# Patient Record
Sex: FEMALE | Race: WHITE | NOT HISPANIC OR LATINO | URBAN - METROPOLITAN AREA
[De-identification: names, ages, dates, MRNs, and addresses within clinical notes are randomized per-mention and may not be internally consistent; named-entity substitution may affect disease eponyms.]

---

## 2022-05-29 ENCOUNTER — INPATIENT (INPATIENT)
Facility: HOSPITAL | Age: 32
LOS: 0 days | Discharge: HOME | End: 2022-05-30
Attending: OBSTETRICS & GYNECOLOGY | Admitting: OBSTETRICS & GYNECOLOGY
Payer: COMMERCIAL

## 2022-05-29 VITALS — HEART RATE: 74 BPM | OXYGEN SATURATION: 96 % | SYSTOLIC BLOOD PRESSURE: 100 MMHG | DIASTOLIC BLOOD PRESSURE: 55 MMHG

## 2022-05-29 DIAGNOSIS — O26.899 OTHER SPECIFIED PREGNANCY RELATED CONDITIONS, UNSPECIFIED TRIMESTER: ICD-10-CM

## 2022-05-29 DIAGNOSIS — Z3A.00 WEEKS OF GESTATION OF PREGNANCY NOT SPECIFIED: ICD-10-CM

## 2022-05-29 DIAGNOSIS — Z34.80 ENCOUNTER FOR SUPERVISION OF OTHER NORMAL PREGNANCY, UNSPECIFIED TRIMESTER: ICD-10-CM

## 2022-05-29 LAB
BASOPHILS # BLD AUTO: 0.03 K/UL — SIGNIFICANT CHANGE UP (ref 0–0.2)
BASOPHILS NFR BLD AUTO: 0.4 % — SIGNIFICANT CHANGE UP (ref 0–2)
BLD GP AB SCN SERPL QL: NEGATIVE — SIGNIFICANT CHANGE UP
COVID-19 SPIKE DOMAIN AB INTERP: POSITIVE
COVID-19 SPIKE DOMAIN ANTIBODY RESULT: 226 U/ML — HIGH
EOSINOPHIL # BLD AUTO: 0 K/UL — SIGNIFICANT CHANGE UP (ref 0–0.5)
EOSINOPHIL NFR BLD AUTO: 0 % — SIGNIFICANT CHANGE UP (ref 0–6)
HCT VFR BLD CALC: 39.2 % — SIGNIFICANT CHANGE UP (ref 34.5–45)
HGB BLD-MCNC: 13.9 G/DL — SIGNIFICANT CHANGE UP (ref 11.5–15.5)
IMM GRANULOCYTES NFR BLD AUTO: 0.2 % — SIGNIFICANT CHANGE UP (ref 0–1.5)
LYMPHOCYTES # BLD AUTO: 1.13 K/UL — SIGNIFICANT CHANGE UP (ref 1–3.3)
LYMPHOCYTES # BLD AUTO: 14 % — SIGNIFICANT CHANGE UP (ref 13–44)
MCHC RBC-ENTMCNC: 32.9 PG — SIGNIFICANT CHANGE UP (ref 27–34)
MCHC RBC-ENTMCNC: 35.5 GM/DL — SIGNIFICANT CHANGE UP (ref 32–36)
MCV RBC AUTO: 92.7 FL — SIGNIFICANT CHANGE UP (ref 80–100)
MONOCYTES # BLD AUTO: 0.54 K/UL — SIGNIFICANT CHANGE UP (ref 0–0.9)
MONOCYTES NFR BLD AUTO: 6.7 % — SIGNIFICANT CHANGE UP (ref 2–14)
NEUTROPHILS # BLD AUTO: 6.33 K/UL — SIGNIFICANT CHANGE UP (ref 1.8–7.4)
NEUTROPHILS NFR BLD AUTO: 78.7 % — HIGH (ref 43–77)
NRBC # BLD: 0 /100 WBCS — SIGNIFICANT CHANGE UP (ref 0–0)
PLATELET # BLD AUTO: 154 K/UL — SIGNIFICANT CHANGE UP (ref 150–400)
RBC # BLD: 4.23 M/UL — SIGNIFICANT CHANGE UP (ref 3.8–5.2)
RBC # FLD: 12.3 % — SIGNIFICANT CHANGE UP (ref 10.3–14.5)
RH IG SCN BLD-IMP: POSITIVE — SIGNIFICANT CHANGE UP
RH IG SCN BLD-IMP: POSITIVE — SIGNIFICANT CHANGE UP
SARS-COV-2 IGG+IGM SERPL QL IA: 226 U/ML — HIGH
SARS-COV-2 IGG+IGM SERPL QL IA: POSITIVE
SARS-COV-2 RNA SPEC QL NAA+PROBE: SIGNIFICANT CHANGE UP
WBC # BLD: 8.05 K/UL — SIGNIFICANT CHANGE UP (ref 3.8–10.5)
WBC # FLD AUTO: 8.05 K/UL — SIGNIFICANT CHANGE UP (ref 3.8–10.5)

## 2022-05-29 RX ORDER — PRAMOXINE HYDROCHLORIDE 150 MG/15G
1 AEROSOL, FOAM RECTAL EVERY 4 HOURS
Refills: 0 | Status: DISCONTINUED | OUTPATIENT
Start: 2022-05-29 | End: 2022-05-30

## 2022-05-29 RX ORDER — SODIUM CHLORIDE 9 MG/ML
3 INJECTION INTRAMUSCULAR; INTRAVENOUS; SUBCUTANEOUS EVERY 8 HOURS
Refills: 0 | Status: DISCONTINUED | OUTPATIENT
Start: 2022-05-29 | End: 2022-05-30

## 2022-05-29 RX ORDER — ACETAMINOPHEN 500 MG
975 TABLET ORAL
Refills: 0 | Status: DISCONTINUED | OUTPATIENT
Start: 2022-05-29 | End: 2022-05-30

## 2022-05-29 RX ORDER — OXYTOCIN 10 UNIT/ML
4 VIAL (ML) INJECTION
Qty: 30 | Refills: 0 | Status: DISCONTINUED | OUTPATIENT
Start: 2022-05-29 | End: 2022-05-30

## 2022-05-29 RX ORDER — OXYTOCIN 10 UNIT/ML
333.33 VIAL (ML) INJECTION
Qty: 20 | Refills: 0 | Status: DISCONTINUED | OUTPATIENT
Start: 2022-05-29 | End: 2022-05-30

## 2022-05-29 RX ORDER — CITRIC ACID/SODIUM CITRATE 300-500 MG
15 SOLUTION, ORAL ORAL EVERY 6 HOURS
Refills: 0 | Status: DISCONTINUED | OUTPATIENT
Start: 2022-05-29 | End: 2022-05-29

## 2022-05-29 RX ORDER — DIBUCAINE 1 %
1 OINTMENT (GRAM) RECTAL EVERY 6 HOURS
Refills: 0 | Status: DISCONTINUED | OUTPATIENT
Start: 2022-05-29 | End: 2022-05-30

## 2022-05-29 RX ORDER — AER TRAVELER 0.5 G/1
1 SOLUTION RECTAL; TOPICAL EVERY 4 HOURS
Refills: 0 | Status: DISCONTINUED | OUTPATIENT
Start: 2022-05-29 | End: 2022-05-30

## 2022-05-29 RX ORDER — MAGNESIUM HYDROXIDE 400 MG/1
30 TABLET, CHEWABLE ORAL
Refills: 0 | Status: DISCONTINUED | OUTPATIENT
Start: 2022-05-29 | End: 2022-05-30

## 2022-05-29 RX ORDER — TETANUS TOXOID, REDUCED DIPHTHERIA TOXOID AND ACELLULAR PERTUSSIS VACCINE, ADSORBED 5; 2.5; 8; 8; 2.5 [IU]/.5ML; [IU]/.5ML; UG/.5ML; UG/.5ML; UG/.5ML
0.5 SUSPENSION INTRAMUSCULAR ONCE
Refills: 0 | Status: DISCONTINUED | OUTPATIENT
Start: 2022-05-29 | End: 2022-05-30

## 2022-05-29 RX ORDER — OXYCODONE HYDROCHLORIDE 5 MG/1
5 TABLET ORAL ONCE
Refills: 0 | Status: DISCONTINUED | OUTPATIENT
Start: 2022-05-29 | End: 2022-05-30

## 2022-05-29 RX ORDER — LANOLIN
1 OINTMENT (GRAM) TOPICAL EVERY 6 HOURS
Refills: 0 | Status: DISCONTINUED | OUTPATIENT
Start: 2022-05-29 | End: 2022-05-30

## 2022-05-29 RX ORDER — SODIUM CHLORIDE 9 MG/ML
1000 INJECTION, SOLUTION INTRAVENOUS
Refills: 0 | Status: DISCONTINUED | OUTPATIENT
Start: 2022-05-29 | End: 2022-05-29

## 2022-05-29 RX ORDER — KETOROLAC TROMETHAMINE 30 MG/ML
30 SYRINGE (ML) INJECTION ONCE
Refills: 0 | Status: DISCONTINUED | OUTPATIENT
Start: 2022-05-29 | End: 2022-05-29

## 2022-05-29 RX ORDER — BENZOCAINE 10 %
1 GEL (GRAM) MUCOUS MEMBRANE EVERY 6 HOURS
Refills: 0 | Status: DISCONTINUED | OUTPATIENT
Start: 2022-05-29 | End: 2022-05-30

## 2022-05-29 RX ORDER — DIPHENHYDRAMINE HCL 50 MG
25 CAPSULE ORAL EVERY 6 HOURS
Refills: 0 | Status: DISCONTINUED | OUTPATIENT
Start: 2022-05-29 | End: 2022-05-30

## 2022-05-29 RX ORDER — HYDROCORTISONE 1 %
1 OINTMENT (GRAM) TOPICAL EVERY 6 HOURS
Refills: 0 | Status: DISCONTINUED | OUTPATIENT
Start: 2022-05-29 | End: 2022-05-30

## 2022-05-29 RX ORDER — IBUPROFEN 200 MG
600 TABLET ORAL EVERY 6 HOURS
Refills: 0 | Status: COMPLETED | OUTPATIENT
Start: 2022-05-29 | End: 2023-04-27

## 2022-05-29 RX ORDER — SIMETHICONE 80 MG/1
80 TABLET, CHEWABLE ORAL EVERY 4 HOURS
Refills: 0 | Status: DISCONTINUED | OUTPATIENT
Start: 2022-05-29 | End: 2022-05-30

## 2022-05-29 RX ORDER — OXYCODONE HYDROCHLORIDE 5 MG/1
5 TABLET ORAL
Refills: 0 | Status: DISCONTINUED | OUTPATIENT
Start: 2022-05-29 | End: 2022-05-30

## 2022-05-29 RX ORDER — IBUPROFEN 200 MG
600 TABLET ORAL EVERY 6 HOURS
Refills: 0 | Status: DISCONTINUED | OUTPATIENT
Start: 2022-05-29 | End: 2022-05-30

## 2022-05-29 RX ADMIN — SODIUM CHLORIDE 125 MILLILITER(S): 9 INJECTION, SOLUTION INTRAVENOUS at 14:59

## 2022-05-29 RX ADMIN — Medication 30 MILLIGRAM(S): at 18:03

## 2022-05-29 RX ADMIN — SODIUM CHLORIDE 125 MILLILITER(S): 9 INJECTION, SOLUTION INTRAVENOUS at 09:33

## 2022-05-29 RX ADMIN — Medication 1000 MILLIUNIT(S)/MIN: at 17:28

## 2022-05-29 RX ADMIN — Medication 4 MILLIUNIT(S)/MIN: at 14:05

## 2022-05-29 RX ADMIN — SODIUM CHLORIDE 125 MILLILITER(S): 9 INJECTION, SOLUTION INTRAVENOUS at 12:13

## 2022-05-29 NOTE — OB PROVIDER H&P - BIRTH SEX
LEFT WRIST 3 VIEWS:

 

HISTORY: 

Swelling and pain.

 

FINDINGS: 

Carpals appear intact.  There are degenerative changes at the 1st carpometacarpal joint.  No fracture
 or acute osseous abnormality.

 

IMPRESSION: 

No acute osseous abnormality.

 

POS: OFF Female

## 2022-05-29 NOTE — CHART NOTE - NSCHARTNOTEFT_GEN_A_CORE
Ob attending note    Pt tried practice pshing at bedside. VE -1 to 0 station. will place peanut and labor down in meantime.    MD Jacinto Ob attending note    Pit to be started for augmentation. Repositioning prn for resuscitation.    MD Jacinto

## 2022-05-29 NOTE — OB RN DELIVERY SUMMARY - NS_FINALEDD_OBGYN_ALL_OB_DT
[FreeTextEntry1] : Verapamil was unavailable and the patient was switched to amlodipine. The patient has been on a diet and lost weight. She walks three quarters of a mile and climbs subway stairs without difficulty. She has heartburn which is an achy sensation 15-30 minutes after a heavy meal. She has had this in the past. The patient last had palpitations a few months ago which had a duration of less than one minute. It is infrequent and comes and goes spontaneously. She also has rare mild dizziness. 27-May-2022

## 2022-05-29 NOTE — CHART NOTE - NSCHARTNOTEFT_GEN_A_CORE
Ob attending note    Discussed labor curve with pt and . Discussed ctx inadequate as no cervical change. Discussed augmentation with pitocin. Discussed r/b/a. Discussed risk of prolonged PROM to mother/baby. All questions answered. Will start pitocin. Pt comfortable w/epi in place.    MD Jacinto

## 2022-05-29 NOTE — OB PROVIDER LABOR PROGRESS NOTE - ASSESSMENT
A/P:  -start pitocin for augmentation  -comfortable with epidural  -c/w EFM, toco, IVF  -peanut ball    D/w Dr. Lida Al PGY-1

## 2022-05-29 NOTE — OB PROVIDER H&P - ASSESSMENT
A/P: 31 yo P0 @ 40w2d presents with SROM in labor  - admit to L&D  - routine labs, COVID19 PCR  - clear diet then NPO when in active labor  - IV hydration  - fetus: cat I, continuous monitoring, vertex  - GBS neg  - labor- expectant management  - anesthesia consult prn

## 2022-05-29 NOTE — OB PROVIDER DELIVERY SUMMARY - NSSELHIDDEN_OBGYN_ALL_OB_FT
[NS_DeliveryAttending1_OBGYN_ALL_OB_FT:[id number]=],[NS_DeliveryAssist1_OBGYN_ALL_OB_FT:[id number]=]

## 2022-05-29 NOTE — OB PROVIDER DELIVERY SUMMARY - NSPROVIDERDELIVERYNOTE_OBGYN_ALL_OB_FT
Spontaneous vaginal delivery of liveborn female infant from SANDY position. Head, shoulders, and body delivered easily. Infant was suctioned. No mec. 1 minute delayed cord clamping was performed. Cord clamped and cut and infant passed to mother. Cord gases obtained. Placenta delivered intact with a 3 vessel cord. Fundal massage was given and uterine fundus was found to be firm. Vaginal exam revealed an intact cervix, vaginal walls and perineum. Patient had right sulcal tear and left periurethral laceration that were repaired with 2.0 and 3.0 vicryl rapide suture. Rectal vault was clear and intact. Excellent hemostasis was noted. Patient was stable and went to recovery. Count was correct x2.

## 2022-05-29 NOTE — OB PROVIDER H&P - NS ATTEND AMEND GEN_ALL_CORE FT
Labor at term  GBS neg  efm/toco  expectant management  if no change, pit for augmentation    MD Jacinto

## 2022-05-29 NOTE — OB RN PATIENT PROFILE - TEACHING/LEARNING FACTORS IMPACT ABILITY TO LEARN
Pre-Visit Chart Review  For Appointment Scheduled on (1/26/18)    Health Maintenance Due   Topic Date Due    Lipid Panel  1976    TETANUS VACCINE  04/17/1994    Pneumococcal PPSV23 (Medium Risk) (1) 04/17/1994    Mammogram  04/17/2016    Influenza Vaccine  08/01/2017                     
none

## 2022-05-29 NOTE — OB PROVIDER H&P - HISTORY OF PRESENT ILLNESS
OB PA Admission Note    33 yo G1 2 40w2d by EDC of 5/27 presents with loss of clear fluid at 630am +CtX since yesterday afternoon    +FM No VB    PNC: uncomplicate  GBS neg  EFW 3400 by [place]    PMH: none  Meds: PNV  All: PCN's/Cephalosporins - hospitalized at 6 yo "they thought i was paralyzed)    GYN: denies fibroids/cysts/STI/abn pap  OB: primigravida  PSH: none  Social: denies toxic habits  Psych: denies history

## 2022-05-29 NOTE — OB RN DELIVERY SUMMARY - NS_SEPSISRSKCALC_OBGYN_ALL_OB_FT
EOS calculated successfully. EOS Risk Factor: 0.5/1000 live births ([facility] national incidence); GA=40w2d; Temp=98.6; ROM=10.3; GBS='Negative'; Antibiotics='No antibiotics or any antibiotics < 2 hrs prior to birth'

## 2022-05-29 NOTE — OB PROVIDER LABOR PROGRESS NOTE - NS_SUBJECTIVE/OBJECTIVE_OBGYN_ALL_OB_FT
Pt examined at bedside c/o increasing pressure. (Backcharting 2/2 clinical duties)  Pt examined at bedside c/o increasing pressure.

## 2022-05-29 NOTE — CHART NOTE - NSCHARTNOTEFT_GEN_A_CORE
OB attending note    Pt examined at bedside. C/o increase in pressure. Noted 3min decel, now recovered Cat 1 tracing.    VE: 8.5/90/-1  EFM: 150bl/+accels/no further decels over last 20 min  [place]: q1-3min    Labor at term  -restart pit for augmentation when able  -efm/toco  -repositioning for resuscitation prn  -ashkan Casey MD Additional Notes: Patient consent was obtained to proceed with the visit and recommended plan of care after discussion of all risks and benefits, including the risks of COVID-19 exposure. Detail Level: Simple

## 2022-05-29 NOTE — OB PROVIDER H&P - NSHPPHYSICALEXAM_GEN_ALL_CORE
ICU Vital Signs Last 24 Hrs  T(C): 37 (29 May 2022 08:06), Max: 37.0 (29 May 2022 07:47)  T(F): 98.6 (29 May 2022 08:06), Max: 98.6 (29 May 2022 07:47)  HR: 70 (29 May 2022 08:10) (67 - 79)  BP: 100/55 (29 May 2022 08:06) (100/55 - 100/55)  BP(mean): --  ABP: --  ABP(mean): --  RR: 19 (29 May 2022 08:06) (19 - 19)  SpO2: 100% (29 May 2022 08:10) (93% - 100%)    Gen: NAD  Heart: S1S2 RRR  Lungs: CTA b/l  Abd: gravid NTND  LE: no calf tenderness    VE: grossly ruptured, clear fluid, 3-4/81/-3    FHt cat I  toco: q5-6min    Sono: vertex

## 2022-05-29 NOTE — OB RN DELIVERY SUMMARY - NSSELHIDDEN_OBGYN_ALL_OB_FT
[NS_DeliveryAttending1_OBGYN_ALL_OB_FT:[id number]],[NS_DeliveryAssist1_OBGYN_ALL_OB_FT:[id number]=],[NS_DeliveryRN_OBGYN_ALL_OB_FT:[id number]=]

## 2022-05-30 ENCOUNTER — TRANSCRIPTION ENCOUNTER (OUTPATIENT)
Age: 32
End: 2022-05-30

## 2022-05-30 VITALS
HEART RATE: 69 BPM | DIASTOLIC BLOOD PRESSURE: 50 MMHG | OXYGEN SATURATION: 97 % | TEMPERATURE: 98 F | SYSTOLIC BLOOD PRESSURE: 91 MMHG | RESPIRATION RATE: 16 BRPM

## 2022-05-30 LAB — T PALLIDUM AB TITR SER: NEGATIVE — SIGNIFICANT CHANGE UP

## 2022-05-30 PROCEDURE — 36415 COLL VENOUS BLD VENIPUNCTURE: CPT

## 2022-05-30 PROCEDURE — 86900 BLOOD TYPING SEROLOGIC ABO: CPT

## 2022-05-30 PROCEDURE — 59025 FETAL NON-STRESS TEST: CPT

## 2022-05-30 PROCEDURE — 86769 SARS-COV-2 COVID-19 ANTIBODY: CPT

## 2022-05-30 PROCEDURE — 85025 COMPLETE CBC W/AUTO DIFF WBC: CPT

## 2022-05-30 PROCEDURE — 86780 TREPONEMA PALLIDUM: CPT

## 2022-05-30 PROCEDURE — 86850 RBC ANTIBODY SCREEN: CPT

## 2022-05-30 PROCEDURE — 87635 SARS-COV-2 COVID-19 AMP PRB: CPT

## 2022-05-30 PROCEDURE — 86901 BLOOD TYPING SEROLOGIC RH(D): CPT

## 2022-05-30 PROCEDURE — 59050 FETAL MONITOR W/REPORT: CPT

## 2022-05-30 PROCEDURE — G0463: CPT

## 2022-05-30 RX ADMIN — Medication 975 MILLIGRAM(S): at 09:22

## 2022-05-30 RX ADMIN — Medication 975 MILLIGRAM(S): at 04:20

## 2022-05-30 RX ADMIN — Medication 975 MILLIGRAM(S): at 03:50

## 2022-05-30 RX ADMIN — Medication 975 MILLIGRAM(S): at 09:52

## 2022-05-30 RX ADMIN — Medication 600 MILLIGRAM(S): at 12:07

## 2022-05-30 RX ADMIN — Medication 600 MILLIGRAM(S): at 12:37

## 2022-05-30 RX ADMIN — Medication 1 TABLET(S): at 12:40

## 2022-05-30 NOTE — PROGRESS NOTE ADULT - NS ATTEND AMEND GEN_ALL_CORE FT
PPD#1  VS stable, voiding spontaneously  meeting postpartum milestones  discharge planning    MD Jacinto

## 2022-05-30 NOTE — PROGRESS NOTE ADULT - SUBJECTIVE AND OBJECTIVE BOX
Postpartum Note- PPD#1    Allergies:  cephalosporins (Anaphylaxis)  penicillins (Anaphylaxis)    RPR Negative  Blood Type  A  --  Positive  Rubella immune      Patient w/o complaints, pain is controlled.    Pt is OOB, tolerating PO, passing flatus. Lochia WNL.     O:  Vital Signs Last 24 Hrs  T(C): 36.8 (30 May 2022 05:17), Max: 37.0 (29 May 2022 07:47)  T(F): 98.2 (30 May 2022 05:17), Max: 98.6 (29 May 2022 07:47)  HR: 60 (30 May 2022 05:17) (56 - 135)  BP: 91/51 (30 May 2022 05:17) (85/48 - 116/61)  BP(mean): --  RR: 18 (30 May 2022 05:17) (18 - 19)  SpO2: 98% (30 May 2022 05:17) (73% - 100%)     Gen: NAD  Heart: S1S2 RRR  Lungs: CTA b/l  Abdomen: Soft, nontender, non-distended, fundus firm.  Lochia WNL  Ext: Neg edema, Neg calf tenderness    LABS:               13.9   8.05  )-----------( 154      ( 05-29 @ 11:37 )             39.2         PAST MEDICAL & SURGICAL HISTORY:  No pertinent past medical history      No significant past surgical history        Current Issues: none            
 Tracing Note     Pt had a few variable decels on monitor however with mod mounika.  Pt found to be sitting up in bed breathing through contractions. Baseline settled down to 135, mod mounika., +accel, - decel. Pt has no epi and MD will be in momentarily.  All questions were solicited and answered.     BETTY Reid MD 
 Tracing Note     Pt had late decels with mod variability.  She was repositioned laterally and given IVF bolus and her tracing improved and is now cat 1. Her BPs were 90s-110s/70-80s.  Her HR is in the 60s and she feels well.  Her exam was deferred as she was [place] every 6 min at that time.  All questions solicited and answered. Will continue to monitor and make her primary OB aware.     BETTY Reid MD

## 2022-05-30 NOTE — DISCHARGE NOTE OB - PATIENT PORTAL LINK FT
You can access the FollowMyHealth Patient Portal offered by [facility] by registering at the following website: [email or web address]. By joining [employer]’s FollowMyHealth portal, you will also be able to view your health information using other applications (apps) compatible with our system.

## 2022-05-30 NOTE — DISCHARGE NOTE OB - MEDICATION SUMMARY - MEDICATIONS TO TAKE
I will START or STAY ON the medications listed below when I get home from the hospital:    Tylenol 325 mg oral capsule  -- 2 cap(s) by mouth every 8 hours, As Needed  -- Indication: For pain med    Motrin 600 mg oral tablet  -- 1 tab(s) by mouth every 6 hours, As Needed  -- Indication: For pain med

## 2022-05-30 NOTE — DISCHARGE NOTE OB - CARE PROVIDER_API CALL
Courtney Hilton  RESIDENCY - OBSTETRIC-GYN  [address] [place] [address]  Phone: [phone]  Fax: [phone]  Follow Up Time:

## 2022-05-30 NOTE — DISCHARGE NOTE OB - CARE PLAN
1 Principal Discharge DX:	 (normal spontaneous vaginal delivery)  Assessment and plan of treatment:	Take tylenol and motrin as directed, alternating every 3 hours.   Continue iron and prenatal vitamin daily. Take colace and mylicon as needed for constipation and gas pain.   Nothing in the vagina and no exercise until 6 week visit. You may continue bleeding for several weeks.  Follow up in the office in 6 weeks for full postpartum visit.   Call the office for appointment confirmation and with any concerns, including breast infection, wound infection, postpartum depression, high blood pressure, and painful calves.

## 2022-05-30 NOTE — DISCHARGE NOTE OB - NS MD DC FALL RISK RISK
For information on Fall & Injury Prevention, visit: [email or web address]-[email or web address]-mobility OR  [email or web address]-[email or web address]-injury OR  https://www.cdc.gov/steadi/patient.html

## 2022-06-01 ENCOUNTER — NON-APPOINTMENT (OUTPATIENT)
Age: 32
End: 2022-06-01

## 2024-05-15 NOTE — DISCHARGE NOTE OB - IF YOU HAVE SEXUAL INTERCOURSE, PREGNANCY CAN OCCUR. THEREFORE, DISCUSS FAMILY PLANNING OPTIONS WITH YOUR HEALTHCARE PROVIDER
[FreeTextEntry1] : Very pleasant 75 year old woman who presents for follow-up of severe left hydronephrosis, left renal atrophy, right renal cysts, history of kidney stones.  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    She previously underwent ESWL in 2011 for a left kidney stone. She reports that the procedure was not successful and that her left kidney failed after the procedure. She reports that she feels well now.  No dysuria.  No hematuria.  No flank pain or suprapubic pain.  No other complaints. Statement Selected

## 2024-05-24 ENCOUNTER — INPATIENT (INPATIENT)
Facility: HOSPITAL | Age: 34
LOS: 1 days | Discharge: HOME | End: 2024-05-26
Attending: OBSTETRICS & GYNECOLOGY | Admitting: OBSTETRICS & GYNECOLOGY
Payer: COMMERCIAL

## 2024-05-24 VITALS — OXYGEN SATURATION: 89 %

## 2024-05-24 DIAGNOSIS — Z34.80 ENCOUNTER FOR SUPERVISION OF OTHER NORMAL PREGNANCY, UNSPECIFIED TRIMESTER: ICD-10-CM

## 2024-05-24 DIAGNOSIS — O26.899 OTHER SPECIFIED PREGNANCY RELATED CONDITIONS, UNSPECIFIED TRIMESTER: ICD-10-CM

## 2024-05-24 LAB
BASOPHILS # BLD AUTO: 0.03 K/UL — SIGNIFICANT CHANGE UP (ref 0–0.2)
BASOPHILS NFR BLD AUTO: 0.4 % — SIGNIFICANT CHANGE UP (ref 0–2)
BLD GP AB SCN SERPL QL: NEGATIVE — SIGNIFICANT CHANGE UP
EOSINOPHIL # BLD AUTO: 0.02 K/UL — SIGNIFICANT CHANGE UP (ref 0–0.5)
EOSINOPHIL NFR BLD AUTO: 0.2 % — SIGNIFICANT CHANGE UP (ref 0–6)
HCT VFR BLD CALC: 36.9 % — SIGNIFICANT CHANGE UP (ref 34.5–45)
HGB BLD-MCNC: 13.2 G/DL — SIGNIFICANT CHANGE UP (ref 11.5–15.5)
IMM GRANULOCYTES NFR BLD AUTO: 0.4 % — SIGNIFICANT CHANGE UP (ref 0–0.9)
LYMPHOCYTES # BLD AUTO: 2.05 K/UL — SIGNIFICANT CHANGE UP (ref 1–3.3)
LYMPHOCYTES # BLD AUTO: 24.6 % — SIGNIFICANT CHANGE UP (ref 13–44)
MCHC RBC-ENTMCNC: 32.9 PG — SIGNIFICANT CHANGE UP (ref 27–34)
MCHC RBC-ENTMCNC: 35.8 GM/DL — SIGNIFICANT CHANGE UP (ref 32–36)
MCV RBC AUTO: 92 FL — SIGNIFICANT CHANGE UP (ref 80–100)
MONOCYTES # BLD AUTO: 0.71 K/UL — SIGNIFICANT CHANGE UP (ref 0–0.9)
MONOCYTES NFR BLD AUTO: 8.5 % — SIGNIFICANT CHANGE UP (ref 2–14)
NEUTROPHILS # BLD AUTO: 5.49 K/UL — SIGNIFICANT CHANGE UP (ref 1.8–7.4)
NEUTROPHILS NFR BLD AUTO: 65.9 % — SIGNIFICANT CHANGE UP (ref 43–77)
NRBC # BLD: 0 /100 WBCS — SIGNIFICANT CHANGE UP (ref 0–0)
PLATELET # BLD AUTO: 143 K/UL — LOW (ref 150–400)
RBC # BLD: 4.01 M/UL — SIGNIFICANT CHANGE UP (ref 3.8–5.2)
RBC # FLD: 12.7 % — SIGNIFICANT CHANGE UP (ref 10.3–14.5)
RH IG SCN BLD-IMP: POSITIVE — SIGNIFICANT CHANGE UP
WBC # BLD: 8.33 K/UL — SIGNIFICANT CHANGE UP (ref 3.8–10.5)
WBC # FLD AUTO: 8.33 K/UL — SIGNIFICANT CHANGE UP (ref 3.8–10.5)

## 2024-05-24 RX ORDER — SODIUM CHLORIDE 9 MG/ML
1000 INJECTION, SOLUTION INTRAVENOUS
Refills: 0 | Status: DISCONTINUED | OUTPATIENT
Start: 2024-05-24 | End: 2024-05-25

## 2024-05-24 RX ORDER — IBUPROFEN 200 MG
1 TABLET ORAL
Qty: 0 | Refills: 0 | DISCHARGE

## 2024-05-24 RX ORDER — ACETAMINOPHEN 500 MG
2 TABLET ORAL
Qty: 0 | Refills: 0 | DISCHARGE

## 2024-05-24 RX ORDER — CHLORHEXIDINE GLUCONATE 213 G/1000ML
1 SOLUTION TOPICAL DAILY
Refills: 0 | Status: DISCONTINUED | OUTPATIENT
Start: 2024-05-24 | End: 2024-05-25

## 2024-05-24 RX ORDER — CITRIC ACID/SODIUM CITRATE 300-500 MG
15 SOLUTION, ORAL ORAL EVERY 6 HOURS
Refills: 0 | Status: DISCONTINUED | OUTPATIENT
Start: 2024-05-24 | End: 2024-05-25

## 2024-05-24 RX ORDER — OXYTOCIN 10 UNIT/ML
333.33 VIAL (ML) INJECTION
Qty: 20 | Refills: 0 | Status: DISCONTINUED | OUTPATIENT
Start: 2024-05-24 | End: 2024-05-25

## 2024-05-24 RX ADMIN — SODIUM CHLORIDE 125 MILLILITER(S): 9 INJECTION, SOLUTION INTRAVENOUS at 22:33

## 2024-05-24 NOTE — OB RN PATIENT PROFILE - FALL HARM RISK - UNIVERSAL INTERVENTIONS
Bed in lowest position, wheels locked, appropriate side rails in place/Call bell, personal items and telephone in reach/Instruct patient to call for assistance before getting out of bed or chair/Non-slip footwear when patient is out of bed/[place] to call system/Physically safe environment - no spills, clutter or unnecessary equipment/Purposeful Proactive Rounding/Room/bathroom lighting operational, light cord in reach

## 2024-05-24 NOTE — OB PROVIDER H&P - HISTORY OF PRESENT ILLNESS
OB Admission H&P    35yo  @ 38w, ZAN  presents c/o leakage of fluid since 530p clear with continued leakage. Denies contractions or VB. +FM.      PNC: Premier  AP Issues: uncomplicated PNC  PNL: GBS negative     OBHx:   2022 FT , F, 7lbs 7oz, no complications   GynHx: denies abnormal Paps, STIs, cysts, fibroids   PMH: denies  PSH: denies  Meds: PNV  Allergies: PCN/cephalosporins>anaphylaxis, pt was hospitalized as a child due to a reaction   Social: denies alcohol/tobacco/drug use in pregnancy   Psych: denies anxiety/depression     Will accept blood transfusions: Yes

## 2024-05-24 NOTE — OB PROVIDER H&P - NSHPPHYSICALEXAM_GEN_ALL_CORE
Vital Signs Last 24 Hrs  T(C): 36.6 (24 May 2024 21:25), Max: 36.6 (24 May 2024 21:10)  T(F): 97.9 (24 May 2024 21:25), Max: 97.9 (24 May 2024 21:25)  HR: 74 (24 May 2024 22:19) (66 - 88)  BP: 123/64 (24 May 2024 21:25) (123/64 - 123/64)  RR: 18 (24 May 2024 21:25) (18 - 18)  SpO2: 97% (24 May 2024 22:19) (89% - 100%)    Gen: alert, NAD  Abd: gravid, soft, non-tender  Ext: wwp, no LE edema or pain bilaterally    SSE: +pooling, +nitrazine  SVE: 2/60/-3    EFM: baseline 145, mod variability, +accels, no decels  [place]: q7min irregular  BSUS: vertex  EFW: 3100

## 2024-05-24 NOTE — OB PROVIDER H&P - ASSESSMENT
33yo  @ 38w ZAN  presenting with leakage of fluid, found to be ruptured, VE /-3, irregular contractions, comfortable. Fetal status reassuring with Cat I tracing. GBS negative. Maternal vitals stable. Will admit for IOL for PROM.     - Admit to L&D  - Admission labs: CBC, RPR, T&S  - Clears, mIVF  - cEFM/[place]  - for PO cytotec   - Anesthesia consult prn for epidural placement     Discussed with Dr. Oppenheim    33yo  @ 38w ZAN  presenting with leakage of fluid, found to be ruptured, VE /-3, irregular contractions, comfortable. Fetal status reassuring with Cat I tracing. GBS negative. Maternal vitals stable. Will admit for IOL for PROM.     - Admit to L&D  - Admission labs: CBC, RPR, T&S  - Clears, mIVF  - cEFM/[place]  - for PO cytotec   - Anesthesia consult prn for epidural placement     Discussed with Dr. Oppenheim     agree with above   seen and eval by me   see delivery note   M. Oppenheim, MD

## 2024-05-25 LAB — T PALLIDUM AB TITR SER: NEGATIVE — SIGNIFICANT CHANGE UP

## 2024-05-25 RX ORDER — HYDROCORTISONE 1 %
1 OINTMENT (GRAM) TOPICAL EVERY 6 HOURS
Refills: 0 | Status: DISCONTINUED | OUTPATIENT
Start: 2024-05-25 | End: 2024-05-26

## 2024-05-25 RX ORDER — IBUPROFEN 200 MG
600 TABLET ORAL EVERY 6 HOURS
Refills: 0 | Status: DISCONTINUED | OUTPATIENT
Start: 2024-05-25 | End: 2024-05-26

## 2024-05-25 RX ORDER — OXYCODONE HYDROCHLORIDE 5 MG/1
5 TABLET ORAL
Refills: 0 | Status: DISCONTINUED | OUTPATIENT
Start: 2024-05-25 | End: 2024-05-26

## 2024-05-25 RX ORDER — MAGNESIUM HYDROXIDE 400 MG/1
30 TABLET, CHEWABLE ORAL
Refills: 0 | Status: DISCONTINUED | OUTPATIENT
Start: 2024-05-25 | End: 2024-05-26

## 2024-05-25 RX ORDER — DIPHENHYDRAMINE HCL 50 MG
25 CAPSULE ORAL EVERY 6 HOURS
Refills: 0 | Status: DISCONTINUED | OUTPATIENT
Start: 2024-05-25 | End: 2024-05-26

## 2024-05-25 RX ORDER — AER TRAVELER 0.5 G/1
1 SOLUTION RECTAL; TOPICAL EVERY 4 HOURS
Refills: 0 | Status: DISCONTINUED | OUTPATIENT
Start: 2024-05-25 | End: 2024-05-26

## 2024-05-25 RX ORDER — KETOROLAC TROMETHAMINE 30 MG/ML
30 SYRINGE (ML) INJECTION ONCE
Refills: 0 | Status: DISCONTINUED | OUTPATIENT
Start: 2024-05-25 | End: 2024-05-25

## 2024-05-25 RX ORDER — IBUPROFEN 200 MG
600 TABLET ORAL EVERY 6 HOURS
Refills: 0 | Status: COMPLETED | OUTPATIENT
Start: 2024-05-25 | End: 2025-04-23

## 2024-05-25 RX ORDER — SODIUM CHLORIDE 9 MG/ML
3 INJECTION INTRAMUSCULAR; INTRAVENOUS; SUBCUTANEOUS EVERY 8 HOURS
Refills: 0 | Status: DISCONTINUED | OUTPATIENT
Start: 2024-05-25 | End: 2024-05-26

## 2024-05-25 RX ORDER — BENZOCAINE 10 %
1 GEL (GRAM) MUCOUS MEMBRANE EVERY 6 HOURS
Refills: 0 | Status: DISCONTINUED | OUTPATIENT
Start: 2024-05-25 | End: 2024-05-26

## 2024-05-25 RX ORDER — OXYTOCIN 10 UNIT/ML
4 VIAL (ML) INJECTION
Qty: 30 | Refills: 0 | Status: DISCONTINUED | OUTPATIENT
Start: 2024-05-25 | End: 2024-05-25

## 2024-05-25 RX ORDER — OXYCODONE HYDROCHLORIDE 5 MG/1
5 TABLET ORAL ONCE
Refills: 0 | Status: DISCONTINUED | OUTPATIENT
Start: 2024-05-25 | End: 2024-05-26

## 2024-05-25 RX ORDER — PRAMOXINE HYDROCHLORIDE 150 MG/15G
1 AEROSOL, FOAM RECTAL EVERY 4 HOURS
Refills: 0 | Status: DISCONTINUED | OUTPATIENT
Start: 2024-05-25 | End: 2024-05-26

## 2024-05-25 RX ORDER — SODIUM CHLORIDE 9 MG/ML
500 INJECTION, SOLUTION INTRAVENOUS ONCE
Refills: 0 | Status: COMPLETED | OUTPATIENT
Start: 2024-05-25 | End: 2024-05-25

## 2024-05-25 RX ORDER — TETANUS TOXOID, REDUCED DIPHTHERIA TOXOID AND ACELLULAR PERTUSSIS VACCINE, ADSORBED 5; 2.5; 8; 8; 2.5 [IU]/.5ML; [IU]/.5ML; UG/.5ML; UG/.5ML; UG/.5ML
0.5 SUSPENSION INTRAMUSCULAR ONCE
Refills: 0 | Status: DISCONTINUED | OUTPATIENT
Start: 2024-05-25 | End: 2024-05-26

## 2024-05-25 RX ORDER — LANOLIN
1 OINTMENT (GRAM) TOPICAL EVERY 6 HOURS
Refills: 0 | Status: DISCONTINUED | OUTPATIENT
Start: 2024-05-25 | End: 2024-05-26

## 2024-05-25 RX ORDER — OXYTOCIN 10 UNIT/ML
41.67 VIAL (ML) INJECTION
Qty: 20 | Refills: 0 | Status: DISCONTINUED | OUTPATIENT
Start: 2024-05-25 | End: 2024-05-26

## 2024-05-25 RX ORDER — DIBUCAINE 1 %
1 OINTMENT (GRAM) RECTAL EVERY 6 HOURS
Refills: 0 | Status: DISCONTINUED | OUTPATIENT
Start: 2024-05-25 | End: 2024-05-26

## 2024-05-25 RX ORDER — ACETAMINOPHEN 500 MG
975 TABLET ORAL
Refills: 0 | Status: DISCONTINUED | OUTPATIENT
Start: 2024-05-25 | End: 2024-05-26

## 2024-05-25 RX ORDER — SIMETHICONE 80 MG/1
80 TABLET, CHEWABLE ORAL EVERY 4 HOURS
Refills: 0 | Status: DISCONTINUED | OUTPATIENT
Start: 2024-05-25 | End: 2024-05-26

## 2024-05-25 RX ADMIN — Medication 975 MILLIGRAM(S): at 15:01

## 2024-05-25 RX ADMIN — Medication 600 MILLIGRAM(S): at 18:15

## 2024-05-25 RX ADMIN — Medication 30 MILLIGRAM(S): at 11:06

## 2024-05-25 RX ADMIN — SODIUM CHLORIDE 500 MILLILITER(S): 9 INJECTION, SOLUTION INTRAVENOUS at 08:26

## 2024-05-25 RX ADMIN — Medication 600 MILLIGRAM(S): at 23:27

## 2024-05-25 RX ADMIN — Medication 975 MILLIGRAM(S): at 21:25

## 2024-05-25 RX ADMIN — Medication 1 TABLET(S): at 15:22

## 2024-05-25 RX ADMIN — Medication 975 MILLIGRAM(S): at 20:26

## 2024-05-25 RX ADMIN — Medication 600 MILLIGRAM(S): at 17:38

## 2024-05-25 RX ADMIN — Medication 4 MILLIUNIT(S)/MIN: at 07:56

## 2024-05-25 NOTE — OB RN DELIVERY SUMMARY - NS_SEPSISRSKCALC_OBGYN_ALL_OB_FT
EOS calculated successfully. EOS Risk Factor: 0.5/1000 live births ([facility] national incidence); GA=38w1d; Temp=98.24; ROM=15.55; GBS='Negative'; Antibiotics='No antibiotics or any antibiotics < 2 hrs prior to birth'

## 2024-05-25 NOTE — OB PROVIDER LABOR PROGRESS NOTE - NS_OBIHIFHRDETAILS_OBGYN_ALL_OB_FT
135bpm, mod variability, +accels, -decels, cat I
Category 2, baseline 150, moderate variability, + accels, + variable and late decels

## 2024-05-25 NOTE — OB RN DELIVERY SUMMARY - BABY A: APGAR 5 MIN RESP RATE, DELIVERY
Detail Level: Zone Photo Preface (Leave Blank If You Do Not Want): Photographs were obtained today (2) good, crying

## 2024-05-25 NOTE — OB PROVIDER LABOR PROGRESS NOTE - NS_SUBJECTIVE/OBJECTIVE_OBGYN_ALL_OB_FT
Patient seen and examined to assess for cervical change due to repeated late/variable decels
05-25-24 @ 05:05  Patient was evaluated at bedside.  Her cervix was checked and found to be 2/70/-3

## 2024-05-25 NOTE — OB RN DELIVERY SUMMARY - BABY A: ID BAND NUMBER, DELIVERY
Reviewed OPTIONS including AVASTIN/EYLEA/LUCENTIS/BEOVU and patient wants to proceed with LUCENTIS treatment AND REPEAT EVERY 29 DAYS X 3. n/a

## 2024-05-25 NOTE — OB PROVIDER DELIVERY SUMMARY - NSSELHIDDEN_OBGYN_ALL_OB_FT
[NS_DeliveryAttending1_OBGYN_ALL_OB_FT:[id number]],[NS_DeliveryAssist1_OBGYN_ALL_OB_FT:[id number]=]

## 2024-05-25 NOTE — OB PROVIDER DELIVERY SUMMARY - NSPROVIDERDELIVERYNOTE_OBGYN_ALL_OB_FT
Spontaneous vaginal delivery of liveborn infant from SANDY position. Head, shoulders, and body delivered easily. Infant was suctioned. No mec. Delayed cord clamping. Infant handed to mom. Cord was clamped and cut. Placenta delivered intact with a 3 vessel cord. Fundal massage was given and uterine fundus was found to be firm. Vaginal exam revealed an intact cervix, vaginal walls and sulci. Patient had a 2nd degree laceration in the perineum that was repaired with 2.0 chromic suture. Excellent hemostasis was noted. Patient was stable and went to recovery. Count was correct x 2.

## 2024-05-25 NOTE — OB RN DELIVERY SUMMARY - NSSELHIDDEN_OBGYN_ALL_OB_FT
[NS_DeliveryAttending1_OBGYN_ALL_OB_FT:[id number]],[NS_DeliveryAssist1_OBGYN_ALL_OB_FT:[id number]=],[NS_DeliveryRN_OBGYN_ALL_OB_FT:[id number]=]

## 2024-05-25 NOTE — OB PROVIDER LABOR PROGRESS NOTE - ASSESSMENT
- Patient fully dilated  - Overall comfortable  - Tracing reassuring with moderate variability, presence of accelerations  - Anticipate   - Continuous EFM/[place]    Dr. Oppenheim contacted, en route to hospital  LUCIEN Forrest, PGY-1
cat I  c/w PO    PLAN:  Continuous FHT/[place]  Maternal/fetal status reassuring  Will continue to reassess prn.    Dennis Zaman PGY1

## 2024-05-26 ENCOUNTER — TRANSCRIPTION ENCOUNTER (OUTPATIENT)
Age: 34
End: 2024-05-26

## 2024-05-26 VITALS
DIASTOLIC BLOOD PRESSURE: 66 MMHG | HEART RATE: 70 BPM | RESPIRATION RATE: 18 BRPM | TEMPERATURE: 98 F | SYSTOLIC BLOOD PRESSURE: 105 MMHG | OXYGEN SATURATION: 99 %

## 2024-05-26 PROCEDURE — 86900 BLOOD TYPING SEROLOGIC ABO: CPT

## 2024-05-26 PROCEDURE — 86901 BLOOD TYPING SEROLOGIC RH(D): CPT

## 2024-05-26 PROCEDURE — 59050 FETAL MONITOR W/REPORT: CPT

## 2024-05-26 PROCEDURE — 85025 COMPLETE CBC W/AUTO DIFF WBC: CPT

## 2024-05-26 PROCEDURE — 86850 RBC ANTIBODY SCREEN: CPT

## 2024-05-26 PROCEDURE — 86780 TREPONEMA PALLIDUM: CPT

## 2024-05-26 RX ORDER — ACETAMINOPHEN 500 MG
3 TABLET ORAL
Qty: 0 | Refills: 0 | DISCHARGE
Start: 2024-05-26

## 2024-05-26 RX ORDER — IBUPROFEN 200 MG
1 TABLET ORAL
Qty: 0 | Refills: 0 | DISCHARGE
Start: 2024-05-26

## 2024-05-26 RX ADMIN — Medication 975 MILLIGRAM(S): at 02:56

## 2024-05-26 RX ADMIN — Medication 600 MILLIGRAM(S): at 06:00

## 2024-05-26 RX ADMIN — Medication 600 MILLIGRAM(S): at 06:46

## 2024-05-26 RX ADMIN — Medication 975 MILLIGRAM(S): at 03:55

## 2024-05-26 RX ADMIN — Medication 975 MILLIGRAM(S): at 10:25

## 2024-05-26 RX ADMIN — Medication 600 MILLIGRAM(S): at 00:35

## 2024-05-26 RX ADMIN — Medication 975 MILLIGRAM(S): at 09:55

## 2024-05-26 NOTE — DISCHARGE NOTE OB - BREAST MILK PROVIDES PROTECTION AGAINST INFECTION
For information on Fall & Injury Prevention, visit: [email or web address]/[email or web address]-[email or web address]-[email or web address]-mobility OR  https://www.[facility]/[email or web address]-[email or web address]-tips-to-avoid-injury OR  https://www.cdc.gov/steadi/patient.html
Statement Selected

## 2024-05-26 NOTE — DISCHARGE NOTE OB - MATERIALS PROVIDED
[facility] Screening Program/Breastfeeding Log/Breastfeeding Mother’s Support Group Information/Guide to Postpartum Care/Back To Sleep Handout/Breastfeeding Guide and Packet/Discharge Medication Information for Patients and Families Pocket Guide

## 2024-05-26 NOTE — DISCHARGE NOTE OB - MEDICATION SUMMARY - MEDICATIONS TO TAKE
I will START or STAY ON the medications listed below when I get home from the hospital:    ibuprofen 600 mg oral tablet  -- 1 tab(s) by mouth every 6 hours  -- Indication: For Supervision of other normal pregnancy, antepartum    acetaminophen 325 mg oral tablet  -- 3 tab(s) by mouth 3 times a day  -- Indication: For Supervision of other normal pregnancy, antepartum    Prenatal Multivitamins with Folic Acid 1 mg oral tablet  -- 1 tab(s) by mouth once a day  -- Indication: For Supervision of other normal pregnancy, antepartum

## 2024-05-26 NOTE — DISCHARGE NOTE OB - CARE PROVIDER_API CALL
Oppenheim, Melissa H  Obstetrics and Gynecology  [address] [facility], [address] [place] [id number]  Phone: [phone]  Fax: [phone]  Follow Up Time:

## 2024-05-26 NOTE — PROGRESS NOTE ADULT - ASSESSMENT
A/P: 35yo PPD#1 s/p .  Patient is stable and doing well post-partum.   - Pain well controlled, continue current pain regimen  - Increase ambulation, SCDs when not ambulating  - Continue regular diet    Linda Tejeda, PGY1   A/P: 35yo PPD#1 s/p .  Patient is stable and doing well post-partum.   - Pain well controlled, continue current pain regimen  - Increase ambulation, SCDs when not ambulating  - Continue regular diet    Linda Tejeda, PGY1    pt seen and eval by me   for NC home   M. Oppenheim, MD

## 2024-05-26 NOTE — DISCHARGE NOTE OB - CARE PLAN
1 Principal Discharge DX:	Vaginal delivery  Assessment and plan of treatment:	pelvic rest, limit activity, regular diet

## 2024-05-26 NOTE — DISCHARGE NOTE OB - NS MD DC FALL RISK RISK
For information on Fall & Injury Prevention, visit: [email or web address]/[email or web address]-[email or web address]-[email or web address]-mobility OR  https://[email or web address].[email or web address]/[email or web address]-tips-to-avoid-injury OR  https://www.cdc.gov/steadi/patient.html

## 2024-05-26 NOTE — PROGRESS NOTE ADULT - SUBJECTIVE AND OBJECTIVE BOX
OB Progress Note:  PPD#1    S: 33yo  PPD#1 s/p . Patient feels well. Pain is well controlled, tolerating regular diet, passing flatus, voiding spontaneously, ambulating without difficulty. Denies heavy vaginal bleeding, CP/SOB, N/V, lightheadedness/dizziness.     O:  Vitals:  Vital Signs Last 24 Hrs  T(C): 36.6 (25 May 2024 20:40), Max: 37 (25 May 2024 16:55)  T(F): 97.8 (25 May 2024 20:40), Max: 98.6 (25 May 2024 16:55)  HR: 67 (25 May 2024 20:40) (60 - 113)  BP: 113/69 (25 May 2024 20:40) (94/50 - 131/70)  BP(mean): --  RR: 18 (25 May 2024 20:40) (16 - 18)  SpO2: 100% (25 May 2024 20:40) (91% - 100%)    Parameters below as of 25 May 2024 20:40  Patient On (Oxygen Delivery Method): room air        MEDICATIONS  (STANDING):  acetaminophen     Tablet .. 975 milliGRAM(s) Oral <User Schedule>  diphtheria/tetanus/pertussis (acellular) Vaccine (Adacel) 0.5 milliLiter(s) IntraMuscular once  ibuprofen  Tablet. 600 milliGRAM(s) Oral every 6 hours  oxytocin Infusion 41.667 milliUNIT(s)/Min (125 mL/Hr) IV Continuous <Continuous>  prenatal multivitamin 1 Tablet(s) Oral daily  sodium chloride 0.9% lock flush 3 milliLiter(s) IV Push every 8 hours      MEDICATIONS  (PRN):  benzocaine 20%/menthol 0.5% Spray 1 Spray(s) Topical every 6 hours PRN for Perineal discomfort  dibucaine 1% Ointment 1 Application(s) Topical every 6 hours PRN Perineal discomfort  diphenhydrAMINE 25 milliGRAM(s) Oral every 6 hours PRN Pruritus  hydrocortisone 1% Cream 1 Application(s) Topical every 6 hours PRN Moderate Pain (4-6)  lanolin Ointment 1 Application(s) Topical every 6 hours PRN nipple soreness  magnesium hydroxide Suspension 30 milliLiter(s) Oral two times a day PRN Constipation  oxyCODONE    IR 5 milliGRAM(s) Oral every 3 hours PRN Moderate to Severe Pain (4-10)  oxyCODONE    IR 5 milliGRAM(s) Oral once PRN Moderate to Severe Pain (4-10)  pramoxine 1%/zinc 5% Cream 1 Application(s) Topical every 4 hours PRN Moderate Pain (4-6)  simethicone 80 milliGRAM(s) Chew every 4 hours PRN Gas  witch hazel Pads 1 Application(s) Topical every 4 hours PRN Perineal discomfort      Labs:  Blood type: A Positive  Rubella IgG: RPR: Negative                          13.2   8.33 >-----------< 143<L>    ( 05-24 @ 22:19 )             36.9                  Physical Exam:  General: NAD  Abdomen: soft, non-tender, non-distended, fundus firm  Vaginal: No heavy vaginal bleeding  Extremities: No erythema/edema

## 2024-05-27 ENCOUNTER — OUTPATIENT (OUTPATIENT)
Dept: OUTPATIENT SERVICES | Facility: HOSPITAL | Age: 34
LOS: 1 days | End: 2024-05-27

## 2024-05-27 VITALS
TEMPERATURE: 98 F | HEART RATE: 61 BPM | RESPIRATION RATE: 17 BRPM | DIASTOLIC BLOOD PRESSURE: 68 MMHG | SYSTOLIC BLOOD PRESSURE: 115 MMHG | OXYGEN SATURATION: 100 %

## 2024-05-27 VITALS
TEMPERATURE: 98 F | SYSTOLIC BLOOD PRESSURE: 113 MMHG | OXYGEN SATURATION: 100 % | HEART RATE: 53 BPM | DIASTOLIC BLOOD PRESSURE: 59 MMHG | RESPIRATION RATE: 16 BRPM

## 2024-05-27 DIAGNOSIS — O26.899 OTHER SPECIFIED PREGNANCY RELATED CONDITIONS, UNSPECIFIED TRIMESTER: ICD-10-CM

## 2024-05-27 NOTE — PRE-ANESTHESIA EVALUATION ADULT - BP NONINVASIVE SYSTOLIC (MM HG)
Anesthesia Evaluation     no history of anesthetic complications:  NPO Solid Status: > 8 hours  NPO Liquid Status: > 2 hours           Airway   Mallampati: I  TM distance: >3 FB  Neck ROM: full  No difficulty expected and Large neck circumference  Dental    (+) poor dentition        Pulmonary - normal exam    breath sounds clear to auscultation  (-) COPD, asthma, sleep apnea, not a smoker    ROS comment: snores  Cardiovascular - normal exam  Exercise tolerance: good (4-7 METS)    Rhythm: regular  Rate: normal    (+) hyperlipidemia,   (-) hypertension, valvular problems/murmurs, dysrhythmias, angina, cardiac stents, DVT      Neuro/Psych  (+) psychiatric history Anxiety and Depression,     (-) seizures, TIA, CVA, headaches, weakness, numbness  GI/Hepatic/Renal/Endo    (+) morbid obesity, GERD well controlled,    (-) hepatitis, liver disease, no renal disease, diabetes, no thyroid disorder    Musculoskeletal         ROS comment: Right foot metarsal fracture    Partial union to the fifth metatarsal fracture.  No   significant change when compared to previous exam.  Abdominal   (+) obese,    Substance History   (+) alcohol use (beer daily),   (-) drug use     OB/GYN          Other        (-) history of cancer                  Anesthesia Plan    ASA 3     general with block   (Popliteal block discussed and patient agrees to proceed)  intravenous induction     Anesthetic plan, all risks, benefits, and alternatives have been provided, discussed and informed consent has been obtained with: patient.       118

## 2024-05-27 NOTE — PROGRESS NOTE ADULT - ASSESSMENT
Checked in on patient 1 hour after the blood patch and reports great improvement of headache and neck stiffness. Reports some pressure in her right ear remaining but left ear pressure has resolved. Reports some sensitive and tenderness in lower back.     Informed patient that her symptoms will continue to improve over the next few hours to days until they resolve completely. Tenderness in the lower back is normal from the epidural / any slight bruising that occurs   She is happy with the results of the blood patch, and being able to move her head/neck now so she can go home and breastfeed her baby.  Checked in on patient 1 hour after the blood patch and reports great improvement of headache and neck stiffness. Reports some pressure in her right ear remaining but left ear pressure has resolved. Reports some sensitivity and tenderness in lower back.     Informed patient that her symptoms will continue to improve over the next few hours to days until they resolve completely. Tenderness in the lower back is normal from the epidural / any slight bruising that occurs. She is happy with the results of the blood patch, and being able to move her head/neck now so she can go home and breastfeed her baby.

## 2024-05-27 NOTE — PRE-ANESTHESIA EVALUATION ADULT - NSANTHADDINFOFT_GEN_ALL_CORE
Risks and benefits of neuraxial anesthesia discussed with patient - including headache, bleeding, infection, and cardiopulmonary complications. All questions were answered. All concerns were addressed.

## 2024-05-27 NOTE — OB POSTPARTUM TRIAGE NOTE - SUICIDE SCREENING QUESTION 2
Spoke with patient via phone for follow up anticoagulation visit.   Last INR on 12/2 was 2.8.  Dose maintained.   Today's INR is 2.4 and is within goal range.    Current warfarin total weekly dose of 52.5 mg verified.  Informed the INR result is within therapeutic range and instructed to maintain current dose per protocol. Discussed dose and return date of 1/18 for next INR. See Anticoagulation flowsheet.    keo MCMAHAN is in the office today supervising the treatment.       Instructed to contact the clinic with any unusual bleeding or bruising, any changes in medications, diet, health status, lifestyle, or any other changes, questions or concerns. Verbalized understanding of all discussed.      No

## 2024-05-27 NOTE — PRE-ANESTHESIA EVALUATION ADULT - NSANTHPMHFT_GEN_ALL_CORE
Medical History discussed with patient. All concerns were addressed.  Patient discharged home  after uneventful . Patient reports some neck stiffness starting Saturday night with worsening symptoms of headache and loud echoing in head/ears bilaterally.   Spoke with patient this morning at 9am and explained treatment options. Told her we would check back in with her at 3pm to see how she was doing. Patient called back at noon with worsening symptoms, unable to tolerate pain, requesting epidural blood patch. Informed patient to come in to the hospital and check in to the L&D floor and we would perform blood patch for her when she got here.

## 2024-05-27 NOTE — OB RN TRIAGE NOTE - FALL HARM RISK - UNIVERSAL INTERVENTIONS
Bed in lowest position, wheels locked, appropriate side rails in place/Call bell, personal items and telephone in reach/Instruct patient to call for assistance before getting out of bed or chair/Non-slip footwear when patient is out of bed/[place] to call system/Physically safe environment - no spills, clutter or unnecessary equipment/Purposeful Proactive Rounding/Room/bathroom lighting operational, light cord in reach

## 2024-05-27 NOTE — PROGRESS NOTE ADULT - SUBJECTIVE AND OBJECTIVE BOX
Epidural Blood Patch performed with two physicians under sterile technique.   Patient's blood was drawn and injected into epidural space slowly (12 mL) until patient reported improvement of neck stiffness symptoms and increased resistance was felt with injection of blood.

## 2024-05-27 NOTE — OB RN TRIAGE NOTE - NS_TRIAGEPROVIDERNOTIFIEDBY_OBGYN_ALL_OB_FT
Samuel REVELES Benzoyl Peroxide Pregnancy And Lactation Text: This medication is Pregnancy Category C. It is unknown if benzoyl peroxide is excreted in breast milk.

## 2025-03-15 NOTE — DISCHARGE NOTE OB - PATIENT PORTAL LINK FT
You can access the FollowMyHealth Patient Portal offered by [facility] by registering at the following website: [email or web address]. By joining [employer]’s FollowMyHealth portal, you will also be able to view your health information using other applications (apps) compatible with our system. [place]